# Patient Record
(demographics unavailable — no encounter records)

---

## 2025-04-08 NOTE — HISTORY OF PRESENT ILLNESS
[FreeTextEntry1] : Pt states 3 months ago he had unprotected sex with a random girl and a week later he developed a burning sensation to to urethra and tip of penis. He is concerned for STD. He reports his PCP checked him for STD and the tests came back negative. Denies frequency, urgency, nocturia, urethral discharge. Occasional left testicular pain.  Takes BP medication, does not know name.

## 2025-04-08 NOTE — ASSESSMENT
[FreeTextEntry1] : Concern for STD, dysuria, testicular pain  Urine dip neg Plan for testicular US Lab: HIV, GC/Chlamydia via urine, Syphilis, herpes, Ureaplasma/mycoplasma Rx: Doxycycline 100 mg BID x 7 days RTO 2 weeks   PT encouraged to f/u PCP regrading depressions screening results

## 2025-04-08 NOTE — PHYSICAL EXAM
[General Appearance - Well Developed] : well developed [General Appearance - Well Nourished] : well nourished [Normal Appearance] : normal appearance [Well Groomed] : well groomed [General Appearance - In No Acute Distress] : no acute distress [Edema] : no peripheral edema [Respiration, Rhythm And Depth] : normal respiratory rhythm and effort [Exaggerated Use Of Accessory Muscles For Inspiration] : no accessory muscle use [Abdomen Soft] : soft [Abdomen Tenderness] : non-tender [Abdomen Hernia] : no hernia was discovered [Urethral Meatus] : meatus normal [Penis Abnormality] : normal circumcised penis [Urinary Bladder Findings] : the bladder was normal on palpation [Scrotum] : the scrotum was normal [Epididymis] : the epididymides were normal [Testes Tenderness] : no tenderness of the testes [Testes Mass (___cm)] : there were no testicular masses [Normal Station and Gait] : the gait and station were normal for the patient's age [Skin Color & Pigmentation] : normal skin color and pigmentation [] : no rash [No Focal Deficits] : no focal deficits [Oriented To Time, Place, And Person] : oriented to person, place, and time [Affect] : the affect was normal [Mood] : the mood was normal [Not Anxious] : not anxious [No Palpable Adenopathy] : no palpable adenopathy [Chaperone Present] : A chaperone was present in the examining room during all aspects of the physical examination [FreeTextEntry2] : LIZET Woodson

## 2025-05-06 NOTE — ASSESSMENT
[FreeTextEntry1] : Dysesthesia  I reviewed and discussed results of STD testing with pt, negative Pt with constant burning sensation to penile skin x 3 months Will refer to dermatology RTO as needed